# Patient Record
Sex: FEMALE | Race: WHITE | NOT HISPANIC OR LATINO | Employment: OTHER | URBAN - METROPOLITAN AREA
[De-identification: names, ages, dates, MRNs, and addresses within clinical notes are randomized per-mention and may not be internally consistent; named-entity substitution may affect disease eponyms.]

---

## 2020-09-24 NOTE — PATIENT DISCUSSION
***The patient is interested in refractive cataract surgery. After discussing all options for becoming less dependent on glasses after surgery, the patient has is considering intermediate and distance vision with the [address] Discussed with patient the benefit of the [address] as a mulitfocal IOL without the compromises of the rings and halos in the IOL design of other multifocals. The anticipated visual outcome is satisfactory distance and intermediate with some near (primarily for digital material- the patient may require glasses for some small detail and/or fine print). ***

## 2020-09-24 NOTE — PATIENT DISCUSSION
REFRACTIVE ERROR, OU - DISCUSSED OPTION OF CORRECTING AT THE TIME OF CATARACT SURGERY. PT UNDERSTANDS AND ELECTS TO CONSIDER THEIR OPTIONS. THE PT PREFERS VIVITY OVER PANOPTIX WHEN DISCUSSING IOL OPTIONS THAT MAY HELP HER TO BE LESS DEPENDENT ON GLASSES.  SHE

## 2020-09-24 NOTE — PATIENT DISCUSSION
Surgery Counseling: I have discussed the option of glasses versus cataract surgery versus following. It was explained that when the patients vision no longer meets their visual needs and a glasses prescription does not improve visual symptoms, the option of cataract surgery is a reasonable next step. It was explained that there is no guarantee that removing the cataract will improve their visual symptoms, however; it is believed that the cataract is contributing to the patient's visual impairment and surgery may improve both the visual and functional status of the patient. The risks, benefits and alternatives of surgery were discussed with the patient. After this discussion, the patient desires to proceed with cataract surgery with implantation of an intraocular lens to improve vision for glare and reading fine print .

## 2020-11-12 NOTE — PATIENT DISCUSSION
***This patient had refractive cataract surgery performed. A multifocal IOL was placed to achieve a target refraction of plano (which should provide them with satisfactory distance vision and intermediate vision). ***

## 2020-11-12 NOTE — PATIENT DISCUSSION
Surgery  Counseling: I have discussed the option of glasses versus cataract surgery versus following . It was explained that when vision no longer meets the patient's visual needs and a new prescription for glasses is not likely to improve all of the patient's visual symptoms, the option of cataract surgery is a reasonable next step. It was explained that there is no guarantee that removing the cataract will improve their visual symptoms, however; it is believed that the cataract is contributing to the patient's visual impairment and surgery may significantly improve both the visual and functional status of the patient. The risks, benefits and alternatives of surgery were discussed with the patient. After this discussion, the patient desires to proceed with cataract surgery with implantation of an intraocular lens to improve vision for glare when driving at might.

## 2020-12-28 NOTE — PATIENT DISCUSSION
S/P PC IOL (VIVITY), OU - PATIENT UNHAPPY WITH DISTANCE VISION, HAPPY WITH INTERMEDIATE AND NEAR. DEMO'D MRX OVER OD AND SLIGHT IMPROVEMENT TO 20/25. PATIENT CONSIDERING [id number] AS EXPLAINED MAY IMPROVE/ADAPT OVER TIME. DISC WE CAN TRIAL WITH CL OD BEFORE PROCEEDING, MAY IMPROVE DIST VISION BUT MAY NOT IMPROVE GLARE/HALOS AND WILL LIKELY COMPROMISE SOME INTERMEDIATE/NEAR. PATIENT WILL CONSIDER AND OPTS TO FOLLOW UP AT NEXT VISIT IN 2MO, UNDERSTANDS CAN COME IN SOONER IF NEEDED.

## 2021-07-13 NOTE — PATIENT DISCUSSION
CONJUNCTIVAL FOREIGN BODY, RLL -REMOVED IN OFFICE WITHOUT COMPLICATION WITH SMALL GAUGE NEEDLE. RX BESIVANCE TID OD X 5 DAYS (SAMPLE GIVEN TODAY).

## 2021-08-24 NOTE — PATIENT DISCUSSION
LINCOLN/K SICCA, OU - SAMPLE OF EYSUVIS GIVEN, USE QID OU X 2 WEEKS THEN BID OU UNTIL BOTTLE GONE. RX XIIDRA BID OU. PATIENT ED TO STOP FML UNTIL FINISHED WITH EYSUVIS, THEN CAN RESUME FOR ITCHING PRN. RTC 4-6 WKS FOR DRY EYE EVAL.

## 2021-08-24 NOTE — PATIENT DISCUSSION
Continue: FML Liquifilm (fluorometholone): drops,suspension: 0.1% 1 drop three times a day as directed into both eyes 08-

## 2021-08-24 NOTE — PATIENT DISCUSSION
New Prescription: Allan Christopher (lifitegrast): dropperette: 5% 1 drop twice a day into both eyes 08-

## 2021-09-28 NOTE — PATIENT DISCUSSION
Dry Eye Syndrome ([place]) Counseling: Dry Eye Syndrome, also known as Keratoconjunctivitis Sicca, is a common condition that occurs when your tears are not able to provide adequate lubrication for your eyes. Symptoms can be exacerbated by environmental factors such as smoke, wind, or prolonged eye use. Treatment options include, but are not limited to, artificial tears, punctal plugs, Restasis, oral omega-3 supplements, and lubricating ointments. I have explained to the patient that succesful management is dependent on patient compliance with treatment as prescribed and/or the treatment regimen.

## 2021-09-28 NOTE — PATIENT DISCUSSION
LINCOLN/K SICCA, OU - STILL SYMPTOMATIC. CONTINUE RESTASIS BID OU. RX LOTEPREDNOL 0.5% BID OU UNTIL NEXT VISIT. ADD REFRESH ARTIFICIAL TEARS TID OU.  RTC X 2 MOS TO MONITOR

## 2021-09-28 NOTE — PATIENT DISCUSSION
New Prescription: loteprednol etabonate (loteprednol etabonate): drops,suspension: 0.5% 1 drop twice a day as directed into both eyes 09-

## 2021-11-23 NOTE — PATIENT DISCUSSION
Patient denied dilation today.  Patient understands the entire health of the eye could not be examined today.  Monitor at next visit with dilated exam.

## 2021-11-23 NOTE — PATIENT DISCUSSION
Ed. patient. Continue restasis BID, reduce loteprednol to qd x 1 mo then stop. Continue refresh art tears TID. Add warm compresses 10 minutes daily. Recommend Silas Mask.

## 2022-02-08 NOTE — PATIENT DISCUSSION
Ed. patient. Patient does not feel like Restasis is helping. D/C Restasis; start Xiidra BID OU. continue art tears TID OU. Monitor x 2-3 mos.

## 2022-04-05 ENCOUNTER — COMPREHENSIVE EXAM (OUTPATIENT)
Dept: URBAN - METROPOLITAN AREA CLINIC 36 | Facility: CLINIC | Age: 37
End: 2022-04-05

## 2022-04-05 DIAGNOSIS — Z01.00: ICD-10-CM

## 2022-04-05 DIAGNOSIS — H52.7: ICD-10-CM

## 2022-04-05 DIAGNOSIS — H10.45: ICD-10-CM

## 2022-04-05 PROCEDURE — 92015 DETERMINE REFRACTIVE STATE: CPT

## 2022-04-05 PROCEDURE — 92014 COMPRE OPH EXAM EST PT 1/>: CPT

## 2022-04-05 RX ORDER — LOTEPREDNOL ETABONATE 2 MG/ML: 1 SUSPENSION/ DROPS OPHTHALMIC TWICE A DAY

## 2022-04-05 ASSESSMENT — TONOMETRY
OD_IOP_MMHG: 13
OS_IOP_MMHG: 13

## 2022-04-05 ASSESSMENT — VISUAL ACUITY
OD_SC: J6
OS_SC: 20/30-1
OS_SC: J6
OU_SC: 20/30
OD_SC: 20/30-1
OU_SC: J6

## 2022-05-17 NOTE — PATIENT DISCUSSION
Ed. patient. Patient states she was sensitive to Derl Slay. Continue restasis BID OU. Refresh BD-QID OU. Monitor.

## 2023-07-14 ENCOUNTER — COMPREHENSIVE EXAM (OUTPATIENT)
Dept: URBAN - METROPOLITAN AREA CLINIC 36 | Facility: CLINIC | Age: 38
End: 2023-07-14

## 2023-07-14 DIAGNOSIS — H52.7: ICD-10-CM

## 2023-07-14 PROCEDURE — 92015 DETERMINE REFRACTIVE STATE: CPT

## 2023-07-14 PROCEDURE — 92014 COMPRE OPH EXAM EST PT 1/>: CPT

## 2023-07-14 ASSESSMENT — TONOMETRY
OD_IOP_MMHG: 19
OS_IOP_MMHG: 15

## 2023-07-14 ASSESSMENT — VISUAL ACUITY
OD_SC: J3
OS_SC: J1
OD_SC: 20/40-1
OD_CC: 20/25+2
OS_SC: 20/30-1
OS_CC: 20/20-1